# Patient Record
Sex: MALE | URBAN - METROPOLITAN AREA
[De-identification: names, ages, dates, MRNs, and addresses within clinical notes are randomized per-mention and may not be internally consistent; named-entity substitution may affect disease eponyms.]

---

## 2019-04-25 ENCOUNTER — APPOINTMENT (RX ONLY)
Dept: URBAN - METROPOLITAN AREA CLINIC 116 | Facility: CLINIC | Age: 74
Setting detail: DERMATOLOGY
End: 2019-04-25

## 2019-04-25 DIAGNOSIS — B02.9 ZOSTER WITHOUT COMPLICATIONS: ICD-10-CM

## 2019-04-25 PROCEDURE — 99202 OFFICE O/P NEW SF 15 MIN: CPT

## 2019-04-25 PROCEDURE — ? COUNSELING

## 2019-04-25 PROCEDURE — ? PRESCRIPTION MEDICATION MANAGEMENT

## 2019-04-25 ASSESSMENT — LOCATION SIMPLE DESCRIPTION DERM
LOCATION SIMPLE: L2 RIGHT ANTERIOR DERMATOME
LOCATION SIMPLE: L2 RIGHT POSTERIOR DERMATOME
LOCATION SIMPLE: L2 RIGHT POSTERIOR DERMATOME
LOCATION SIMPLE: L2 RIGHT ANTERIOR DERMATOME

## 2019-04-25 ASSESSMENT — LOCATION ZONE DERM
LOCATION ZONE: TRUNK
LOCATION ZONE: LEG
LOCATION ZONE: TRUNK
LOCATION ZONE: LEG

## 2019-04-25 ASSESSMENT — LOCATION DETAILED DESCRIPTION DERM
LOCATION DETAILED: L2 RIGHT ANTERIOR DERMATOME
LOCATION DETAILED: L2 RIGHT ANTERIOR DERMATOME
LOCATION DETAILED: L2 RIGHT POSTERIOR DERMATOME
LOCATION DETAILED: L2 RIGHT POSTERIOR DERMATOME

## 2019-04-25 NOTE — HPI: SHINGLES (HERPES ZOSTER)
How Severe Are Your Shingles?: moderate
Is This A New Presentation, Or A Follow-Up?: Shingles
Additional History: ER started him on Valtrex 1g TID and Lyrica.  Pain has lessened since starting the Valtrex

## 2019-04-25 NOTE — PROCEDURE: PRESCRIPTION MEDICATION MANAGEMENT
Initiate Treatment: Mupirocin ointment
Continue Regimen: Valcyclovir
Render In Strict Bullet Format?: No
Detail Level: Zone

## 2019-05-06 ENCOUNTER — APPOINTMENT (RX ONLY)
Dept: URBAN - METROPOLITAN AREA CLINIC 116 | Facility: CLINIC | Age: 74
Setting detail: DERMATOLOGY
End: 2019-05-06

## 2019-05-06 DIAGNOSIS — B02.9 ZOSTER WITHOUT COMPLICATIONS: ICD-10-CM

## 2019-05-06 PROBLEM — E78.5 HYPERLIPIDEMIA, UNSPECIFIED: Status: ACTIVE | Noted: 2019-05-06

## 2019-05-06 PROBLEM — I10 ESSENTIAL (PRIMARY) HYPERTENSION: Status: ACTIVE | Noted: 2019-05-06

## 2019-05-06 PROCEDURE — 99212 OFFICE O/P EST SF 10 MIN: CPT

## 2019-05-06 PROCEDURE — ? COUNSELING

## 2019-05-06 PROCEDURE — ? PRESCRIPTION MEDICATION MANAGEMENT

## 2019-05-06 ASSESSMENT — LOCATION DETAILED DESCRIPTION DERM
LOCATION DETAILED: L2 RIGHT POSTERIOR DERMATOME
LOCATION DETAILED: L2 RIGHT POSTERIOR DERMATOME
LOCATION DETAILED: L2 RIGHT ANTERIOR DERMATOME
LOCATION DETAILED: L2 RIGHT ANTERIOR DERMATOME

## 2019-05-06 ASSESSMENT — LOCATION ZONE DERM
LOCATION ZONE: TRUNK
LOCATION ZONE: TRUNK
LOCATION ZONE: LEG
LOCATION ZONE: LEG

## 2019-05-06 ASSESSMENT — LOCATION SIMPLE DESCRIPTION DERM
LOCATION SIMPLE: L2 RIGHT POSTERIOR DERMATOME
LOCATION SIMPLE: L2 RIGHT POSTERIOR DERMATOME
LOCATION SIMPLE: L2 RIGHT ANTERIOR DERMATOME
LOCATION SIMPLE: L2 RIGHT ANTERIOR DERMATOME

## 2019-05-06 NOTE — PROCEDURE: PRESCRIPTION MEDICATION MANAGEMENT
Continue Regimen: Valcyclovir 1 gram TID
Plan: declines used mupirocin \\nimproved, no active vesicles
Render In Strict Bullet Format?: No
Detail Level: Zone

## 2019-05-13 ENCOUNTER — APPOINTMENT (RX ONLY)
Dept: URBAN - METROPOLITAN AREA CLINIC 116 | Facility: CLINIC | Age: 74
Setting detail: DERMATOLOGY
End: 2019-05-13

## 2019-05-13 DIAGNOSIS — L30.4 ERYTHEMA INTERTRIGO: ICD-10-CM

## 2019-05-13 DIAGNOSIS — Z02.9 ENCOUNTER FOR ADMINISTRATIVE EXAMINATIONS, UNSPECIFIED: ICD-10-CM

## 2019-05-13 DIAGNOSIS — B02.9 ZOSTER WITHOUT COMPLICATIONS: ICD-10-CM | Status: IMPROVED

## 2019-05-13 PROCEDURE — ? PRESCRIPTION

## 2019-05-13 PROCEDURE — ? COUNSELING

## 2019-05-13 PROCEDURE — ? PRESCRIPTION MEDICATION MANAGEMENT

## 2019-05-13 PROCEDURE — 99213 OFFICE O/P EST LOW 20 MIN: CPT

## 2019-05-13 PROCEDURE — ? ADDITIONAL NOTES

## 2019-05-13 RX ORDER — HYDROCORTISONE 25 MG/G
CREAM TOPICAL
Qty: 1 | Refills: 0 | Status: ERX | COMMUNITY
Start: 2019-05-13

## 2019-05-13 RX ORDER — MUPIROCIN 20 MG/G
OINTMENT TOPICAL
Qty: 1 | Refills: 0 | Status: CANCELLED
Stop reason: CLARIF

## 2019-05-13 RX ORDER — VALACYCLOVIR HYDROCHLORIDE 1 G/1
TABLET, FILM COATED ORAL TID
Qty: 21 | Refills: 0 | Status: ERX | COMMUNITY
Start: 2019-05-13

## 2019-05-13 RX ORDER — KETOCONAZOLE 20 MG/G
CREAM TOPICAL
Qty: 1 | Refills: 1 | Status: ERX | COMMUNITY
Start: 2019-05-13

## 2019-05-13 RX ADMIN — VALACYCLOVIR HYDROCHLORIDE 1: 1 TABLET, FILM COATED ORAL at 00:00

## 2019-05-13 RX ADMIN — KETOCONAZOLE 1: 20 CREAM TOPICAL at 00:00

## 2019-05-13 RX ADMIN — HYDROCORTISONE 1: 25 CREAM TOPICAL at 00:00

## 2019-05-13 ASSESSMENT — LOCATION ZONE DERM
LOCATION ZONE: TRUNK
LOCATION ZONE: LEG
LOCATION ZONE: LEG
LOCATION ZONE: TRUNK

## 2019-05-13 ASSESSMENT — LOCATION SIMPLE DESCRIPTION DERM
LOCATION SIMPLE: L2 RIGHT ANTERIOR DERMATOME
LOCATION SIMPLE: L2 RIGHT ANTERIOR DERMATOME
LOCATION SIMPLE: L2 RIGHT POSTERIOR DERMATOME
LOCATION SIMPLE: L2 RIGHT POSTERIOR DERMATOME
LOCATION SIMPLE: GROIN

## 2019-05-13 ASSESSMENT — LOCATION DETAILED DESCRIPTION DERM
LOCATION DETAILED: L2 RIGHT POSTERIOR DERMATOME
LOCATION DETAILED: LEFT INGUINAL CREASE
LOCATION DETAILED: RIGHT INGUINAL CREASE
LOCATION DETAILED: L2 RIGHT POSTERIOR DERMATOME
LOCATION DETAILED: L2 RIGHT ANTERIOR DERMATOME
LOCATION DETAILED: L2 RIGHT ANTERIOR DERMATOME

## 2019-05-13 NOTE — PROCEDURE: PRESCRIPTION MEDICATION MANAGEMENT
Render In Strict Bullet Format?: No
Initiate Treatment: Mupirocin
Plan: improved, no active vesicles
Detail Level: Zone
Continue Regimen: Valcyclovir 1 gram TID

## 2019-05-28 ENCOUNTER — APPOINTMENT (RX ONLY)
Dept: URBAN - METROPOLITAN AREA CLINIC 116 | Facility: CLINIC | Age: 74
Setting detail: DERMATOLOGY
End: 2019-05-28

## 2019-05-28 DIAGNOSIS — B02.9 ZOSTER WITHOUT COMPLICATIONS: ICD-10-CM | Status: IMPROVED

## 2019-05-28 DIAGNOSIS — L30.4 ERYTHEMA INTERTRIGO: ICD-10-CM | Status: RESOLVED

## 2019-05-28 PROCEDURE — 99213 OFFICE O/P EST LOW 20 MIN: CPT

## 2019-05-28 PROCEDURE — ? INVENTORY

## 2019-05-28 PROCEDURE — ? COUNSELING

## 2019-05-28 PROCEDURE — ? PRESCRIPTION MEDICATION MANAGEMENT

## 2019-05-28 ASSESSMENT — LOCATION SIMPLE DESCRIPTION DERM
LOCATION SIMPLE: L2 RIGHT POSTERIOR DERMATOME
LOCATION SIMPLE: L2 RIGHT ANTERIOR DERMATOME
LOCATION SIMPLE: L2 RIGHT POSTERIOR DERMATOME
LOCATION SIMPLE: L2 RIGHT ANTERIOR DERMATOME
LOCATION SIMPLE: GROIN

## 2019-05-28 ASSESSMENT — LOCATION ZONE DERM
LOCATION ZONE: TRUNK
LOCATION ZONE: TRUNK
LOCATION ZONE: LEG
LOCATION ZONE: LEG

## 2019-05-28 ASSESSMENT — LOCATION DETAILED DESCRIPTION DERM
LOCATION DETAILED: RIGHT INGUINAL CREASE
LOCATION DETAILED: L2 RIGHT ANTERIOR DERMATOME
LOCATION DETAILED: L2 RIGHT ANTERIOR DERMATOME
LOCATION DETAILED: LEFT INGUINAL CREASE
LOCATION DETAILED: L2 RIGHT POSTERIOR DERMATOME
LOCATION DETAILED: L2 RIGHT POSTERIOR DERMATOME

## 2019-05-28 NOTE — PROCEDURE: PRESCRIPTION MEDICATION MANAGEMENT
Discontinue Regimen: Ketoconazole cream, hydrocortisone cream
Render In Strict Bullet Format?: No
Detail Level: Zone